# Patient Record
Sex: MALE | Race: WHITE | NOT HISPANIC OR LATINO | URBAN - METROPOLITAN AREA
[De-identification: names, ages, dates, MRNs, and addresses within clinical notes are randomized per-mention and may not be internally consistent; named-entity substitution may affect disease eponyms.]

---

## 2017-11-09 ENCOUNTER — OUTPATIENT (OUTPATIENT)
Dept: OUTPATIENT SERVICES | Facility: HOSPITAL | Age: 37
LOS: 1 days | End: 2017-11-09
Payer: COMMERCIAL

## 2017-11-09 VITALS
DIASTOLIC BLOOD PRESSURE: 77 MMHG | WEIGHT: 195.11 LBS | SYSTOLIC BLOOD PRESSURE: 132 MMHG | HEIGHT: 74 IN | TEMPERATURE: 99 F | RESPIRATION RATE: 14 BRPM | HEART RATE: 74 BPM

## 2017-11-09 DIAGNOSIS — D21.0 BENIGN NEOPLASM OF CONNECTIVE AND OTHER SOFT TISSUE OF HEAD, FACE AND NECK: ICD-10-CM

## 2017-11-09 DIAGNOSIS — Z98.890 OTHER SPECIFIED POSTPROCEDURAL STATES: Chronic | ICD-10-CM

## 2017-11-09 DIAGNOSIS — Z41.9 ENCOUNTER FOR PROCEDURE FOR PURPOSES OTHER THAN REMEDYING HEALTH STATE, UNSPECIFIED: Chronic | ICD-10-CM

## 2017-11-09 DIAGNOSIS — Z01.818 ENCOUNTER FOR OTHER PREPROCEDURAL EXAMINATION: ICD-10-CM

## 2017-11-09 LAB
ANION GAP SERPL CALC-SCNC: 5 MMOL/L — SIGNIFICANT CHANGE UP (ref 5–17)
BUN SERPL-MCNC: 15 MG/DL — SIGNIFICANT CHANGE UP (ref 7–23)
CALCIUM SERPL-MCNC: 9.3 MG/DL — SIGNIFICANT CHANGE UP (ref 8.5–10.1)
CHLORIDE SERPL-SCNC: 103 MMOL/L — SIGNIFICANT CHANGE UP (ref 96–108)
CO2 SERPL-SCNC: 33 MMOL/L — HIGH (ref 22–31)
CREAT SERPL-MCNC: 1 MG/DL — SIGNIFICANT CHANGE UP (ref 0.5–1.3)
GLUCOSE SERPL-MCNC: 82 MG/DL — SIGNIFICANT CHANGE UP (ref 70–99)
HCT VFR BLD CALC: 47.9 % — SIGNIFICANT CHANGE UP (ref 39–50)
HGB BLD-MCNC: 15.5 G/DL — SIGNIFICANT CHANGE UP (ref 13–17)
MCHC RBC-ENTMCNC: 30.4 PG — SIGNIFICANT CHANGE UP (ref 27–34)
MCHC RBC-ENTMCNC: 32.5 GM/DL — SIGNIFICANT CHANGE UP (ref 32–36)
MCV RBC AUTO: 93.6 FL — SIGNIFICANT CHANGE UP (ref 80–100)
PLATELET # BLD AUTO: 166 K/UL — SIGNIFICANT CHANGE UP (ref 150–400)
POTASSIUM SERPL-MCNC: 4.1 MMOL/L — SIGNIFICANT CHANGE UP (ref 3.5–5.3)
POTASSIUM SERPL-SCNC: 4.1 MMOL/L — SIGNIFICANT CHANGE UP (ref 3.5–5.3)
RBC # BLD: 5.12 M/UL — SIGNIFICANT CHANGE UP (ref 4.2–5.8)
RBC # FLD: 11.3 % — SIGNIFICANT CHANGE UP (ref 10.3–14.5)
SODIUM SERPL-SCNC: 141 MMOL/L — SIGNIFICANT CHANGE UP (ref 135–145)
WBC # BLD: 5.6 K/UL — SIGNIFICANT CHANGE UP (ref 3.8–10.5)
WBC # FLD AUTO: 5.6 K/UL — SIGNIFICANT CHANGE UP (ref 3.8–10.5)

## 2017-11-09 PROCEDURE — 85027 COMPLETE CBC AUTOMATED: CPT

## 2017-11-09 PROCEDURE — G0463: CPT

## 2017-11-09 PROCEDURE — 80048 BASIC METABOLIC PNL TOTAL CA: CPT

## 2017-11-09 NOTE — H&P PST ADULT - PMH
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

## 2017-11-09 NOTE — H&P PST ADULT - ANESTHESIA, PREVIOUS REACTION, PROFILE
Pt notes urinary retention following nasal septoplasty which went on for a few days - saw urologist - noted urinary hesitancy - difficulty emptying bladder which resolved without medication

## 2017-11-09 NOTE — H&P PST ADULT - HISTORY OF PRESENT ILLNESS
36 year old male h/o HTN, Anxiety presents for PST prior to Excision of Scalp mass with Dr Bill Phillips on 11/17/17 - 36 year old male h/o HTN, Anxiety presents for PST prior to Excision of Scalp mass with Dr Bill Phillips on 11/17/17 - pt notes he initially noticed it and mentioned it to his doctor about 4 yrs ago and he mentioned it to his PCP who had said they would keep an eye on it - pt notes over the last yr it became bigger and more "Annoying when combing hair/touching area." - Pt notes PCP sent him for consult and following consult pt is electing for scheduled procedure.

## 2017-11-09 NOTE — H&P PST ADULT - ASSESSMENT
36 year old male with HTN and Anxiety - scheduled for Excision of Scalp Mass with Dr Bill Phillips on 11/17/17

## 2017-11-09 NOTE — H&P PST ADULT - NSANTHOSAYNRD_GEN_A_CORE
No. LUPE screening performed.  STOP BANG Legend: 0-2 = LOW Risk; 3-4 = INTERMEDIATE Risk; 5-8 = HIGH Risk/Notes doctor wants him to have a sleep study

## 2017-11-09 NOTE — H&P PST ADULT - PROBLEM SELECTOR PLAN 1
PST Labs; CBC, BMP - No Medical Clearance Needed - pre-op instructions given to pt with understanding verbalized

## 2017-11-09 NOTE — H&P PST ADULT - FAMILY HISTORY
Mother  Still living? Yes, Estimated age: Age Unknown  Hypertension, Age at diagnosis: Age Unknown  Family history of irritable bowel syndrome, Age at diagnosis: Age Unknown

## 2017-11-16 RX ORDER — SODIUM CHLORIDE 9 MG/ML
1000 INJECTION, SOLUTION INTRAVENOUS
Qty: 0 | Refills: 0 | Status: DISCONTINUED | OUTPATIENT
Start: 2017-11-17 | End: 2017-11-17

## 2017-11-17 ENCOUNTER — OUTPATIENT (OUTPATIENT)
Dept: OUTPATIENT SERVICES | Facility: HOSPITAL | Age: 37
LOS: 1 days | End: 2017-11-17
Payer: COMMERCIAL

## 2017-11-17 VITALS
DIASTOLIC BLOOD PRESSURE: 79 MMHG | HEART RATE: 77 BPM | TEMPERATURE: 98 F | OXYGEN SATURATION: 100 % | SYSTOLIC BLOOD PRESSURE: 124 MMHG | RESPIRATION RATE: 14 BRPM

## 2017-11-17 VITALS
SYSTOLIC BLOOD PRESSURE: 124 MMHG | RESPIRATION RATE: 16 BRPM | OXYGEN SATURATION: 99 % | HEIGHT: 74 IN | HEART RATE: 65 BPM | TEMPERATURE: 99 F | DIASTOLIC BLOOD PRESSURE: 87 MMHG | WEIGHT: 195.11 LBS

## 2017-11-17 DIAGNOSIS — Z01.818 ENCOUNTER FOR OTHER PREPROCEDURAL EXAMINATION: ICD-10-CM

## 2017-11-17 DIAGNOSIS — Z41.9 ENCOUNTER FOR PROCEDURE FOR PURPOSES OTHER THAN REMEDYING HEALTH STATE, UNSPECIFIED: Chronic | ICD-10-CM

## 2017-11-17 DIAGNOSIS — D21.0 BENIGN NEOPLASM OF CONNECTIVE AND OTHER SOFT TISSUE OF HEAD, FACE AND NECK: ICD-10-CM

## 2017-11-17 DIAGNOSIS — Z98.890 OTHER SPECIFIED POSTPROCEDURAL STATES: Chronic | ICD-10-CM

## 2017-11-17 PROCEDURE — 88305 TISSUE EXAM BY PATHOLOGIST: CPT | Mod: 26

## 2017-11-17 PROCEDURE — 21014 EXC FACE TUM DEEP 2 CM/>: CPT

## 2017-11-17 PROCEDURE — 88305 TISSUE EXAM BY PATHOLOGIST: CPT

## 2017-11-17 RX ORDER — ESCITALOPRAM OXALATE 10 MG/1
1 TABLET, FILM COATED ORAL
Qty: 0 | Refills: 0 | COMMUNITY

## 2017-11-17 RX ORDER — ACETAMINOPHEN 500 MG
2 TABLET ORAL
Qty: 0 | Refills: 0 | COMMUNITY

## 2017-11-17 RX ORDER — SODIUM CHLORIDE 9 MG/ML
1000 INJECTION, SOLUTION INTRAVENOUS
Qty: 0 | Refills: 0 | Status: DISCONTINUED | OUTPATIENT
Start: 2017-11-17 | End: 2017-11-17

## 2017-11-17 RX ORDER — RAMIPRIL 5 MG
1 CAPSULE ORAL
Qty: 0 | Refills: 0 | COMMUNITY

## 2017-11-17 RX ORDER — ONDANSETRON 8 MG/1
4 TABLET, FILM COATED ORAL ONCE
Qty: 0 | Refills: 0 | Status: DISCONTINUED | OUTPATIENT
Start: 2017-11-17 | End: 2017-11-17

## 2017-11-17 RX ORDER — OXYCODONE AND ACETAMINOPHEN 5; 325 MG/1; MG/1
1 TABLET ORAL EVERY 4 HOURS
Qty: 0 | Refills: 0 | Status: DISCONTINUED | OUTPATIENT
Start: 2017-11-17 | End: 2017-11-17

## 2017-11-17 RX ORDER — HYDROMORPHONE HYDROCHLORIDE 2 MG/ML
0.5 INJECTION INTRAMUSCULAR; INTRAVENOUS; SUBCUTANEOUS
Qty: 0 | Refills: 0 | Status: DISCONTINUED | OUTPATIENT
Start: 2017-11-17 | End: 2017-11-17

## 2017-11-17 RX ORDER — OXYCODONE AND ACETAMINOPHEN 5; 325 MG/1; MG/1
2 TABLET ORAL EVERY 6 HOURS
Qty: 0 | Refills: 0 | Status: DISCONTINUED | OUTPATIENT
Start: 2017-11-17 | End: 2017-11-17

## 2017-11-17 RX ORDER — MAGNESIUM HYDROXIDE 400 MG/1
0 TABLET, CHEWABLE ORAL
Qty: 0 | Refills: 0 | COMMUNITY

## 2017-11-17 RX ORDER — CEFAZOLIN SODIUM 1 G
1000 VIAL (EA) INJECTION ONCE
Qty: 0 | Refills: 0 | Status: COMPLETED | OUTPATIENT
Start: 2017-11-17 | End: 2017-11-17

## 2017-11-17 RX ADMIN — SODIUM CHLORIDE 75 MILLILITER(S): 9 INJECTION, SOLUTION INTRAVENOUS at 12:54

## 2017-11-17 NOTE — ASU DISCHARGE PLAN (ADULT/PEDIATRIC). - NOTIFY
Increased Irritability or Sluggishness/Persistent Nausea and Vomiting/Inability to Tolerate Liquids or Foods/Fever greater than 101/Bleeding that does not stop/Unable to Urinate/Pain not relieved by Medications

## 2017-11-17 NOTE — ASU DISCHARGE PLAN (ADULT/PEDIATRIC). - MEDICATION SUMMARY - MEDICATIONS TO TAKE
I will START or STAY ON the medications listed below when I get home from the hospital:    Norco 5 mg-325 mg oral tablet  -- 1-2 tab(s) by mouth every 4-6 hours as needed for pain. MDD:6  -- Caution federal law prohibits the transfer of this drug to any person other  than the person for whom it was prescribed.  May cause drowsiness.  Alcohol may intensify this effect.  Use care when operating dangerous machinery.  This product contains acetaminophen.  Do not use  with any other product containing acetaminophen to prevent possible liver damage.  Using more of this medication than prescribed may cause serious breathing problems.    -- Indication: For Pain med    Tylenol 325 mg oral tablet  -- 2 tab(s) by mouth every 4 hours as needed for mild pan. **Note Tylenol in Norco- Max daily dose of Tylenol should not exceed 3200mg/24 hrs.  -- Indication: For Mild pain    ramipril 10 mg oral capsule  -- 1 cap(s) by mouth once a day (at bedtime)  -- Indication: For Home med    Milk of Magnesia  -- 30 cc by mouth daily as needed for constipation while on Norco for pain.  -- Indication: For Constipation    Lexapro 10 mg oral tablet  -- 1 tab(s) by mouth once a day (at bedtime)  -- Indication: For Home med

## 2017-11-17 NOTE — ASU DISCHARGE PLAN (ADULT/PEDIATRIC). - SPECIAL INSTRUCTIONS
Keep incision clean, dry and intact x 24 hrs. After 24 hrs, you may begin showering as usual. Do not scrub or soak incision site. Pat dry. NO tub baths, NO swimming pools. No hot tubs. Apply waterproof ice pack to incision area 20 mins on, 20 mins off to help decrease pain and swelling.

## 2017-11-17 NOTE — ASU PATIENT PROFILE, ADULT - PMH
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

## 2017-11-21 LAB — SURGICAL PATHOLOGY FINAL REPORT - CH: SIGNIFICANT CHANGE UP

## 2024-07-15 NOTE — ASU PREOP CHECKLIST - ALLERGIES REVIEWED
Diabetes Self-Management Education & Support    Presents for:      Type of service:  Video Visit      ASSESSMENT:  Follow up visit for diabetes education.  He was prescribed Ozempic but never started it (due to fear of needles as well as concern about thyroid cancer). He has also stopped taking metformin now - had side effects. Shares that he wants to continue focusing on lifestyle modification and would rather not take any medication. Reports that he has been testing BS 1x/d and most of FBS have been at goal/under 130. Feels he knows a fair amount about diabetes management already.    Reports good support from spouse, family     Consumes 3 meals and 1-2 snacks   Food recall:  BF:corn beef hash, 2 eggs, 2 toasts (multi grain)  L:pizza, crazy bread (with marinara sauce)  D:pizza, blizzard  Snacks: corn chips  4 -5 cups of water/other non - sugary beverages. Has cut back on soda and limiting it to once every other day    No planned activity. [employer] - work is very active      Patient's most recent   Lab Results   Component Value Date    A1C 6.5 05/13/2024     is meeting goal of <7.0    PLAN    Continue to test BG 1x/d: fasting/2 hours after a meal.  Your blood sugars goals are:  Before eating (fasting): 80 - 130 mg/dL  2-hours after meals (post-prandial): less than 180 mg/dL     Continue to watch your carb intake and limit to 45-60 grams of carbs per meal. 15-30 grams for snacks.    Regular physical activity as able/safe    Topics to cover at upcoming visits: Problem Solving and Reducing Risks    Follow-up: 9/17    See Care Plan for co-developed, patient-state behavior change goals.  AVS provided for patient today.    Education Materials Provided:  N/a    SUBJECTIVE/OBJECTIVE:  Diabetes education in the past 24mo: Yes  Diabetes type: Type 2  Disease course: Stable  How confident are you filling out medical forms by yourself:: Extremely  Cultural Influences/Ethnic Background:  Not  or     Diabetes  "Symptoms & Complications:  Diabetes Related Symptoms: Neuropathy, Polyuria (increased urination)  Weight trend: Decreasing  Symptom course: Stable  Disease course: Stable       Patient Problem List and Family Medical History reviewed for relevant medical history, current medical status, and diabetes risk factors.    Vitals:  There were no vitals taken for this visit.  Estimated body mass index is 33.69 kg/m  as calculated from the following:    Height as of 5/13/24: 1.657 m (5' 5.25\").    Weight as of 5/13/24: 92.5 kg (204 lb).   Last 3 BP:   BP Readings from Last 3 Encounters:   05/13/24 122/86   04/22/24 122/70   07/19/23 121/81       History   Smoking Status    Every Day    Types: Cigarettes   Smokeless Tobacco    Never       Labs:  Lab Results   Component Value Date    A1C 6.5 05/13/2024     Lab Results   Component Value Date     05/13/2024     04/07/2022     04/03/2021     Lab Results   Component Value Date    LDL 63 05/13/2024    LDL 67 03/01/2021     HDL Cholesterol   Date Value Ref Range Status   03/01/2021 45 >39 mg/dL Final     Direct Measure HDL   Date Value Ref Range Status   05/13/2024 42 >=40 mg/dL Final   ]  GFR Estimate   Date Value Ref Range Status   05/13/2024 68 >60 mL/min/1.73m2 Final   04/03/2021 >60 >60 ml/min/1.73m2 Final     GFR Estimate If Black   Date Value Ref Range Status   04/03/2021 >60 >60 ml/min/1.73m2 Final     Lab Results   Component Value Date    CR 1.17 05/13/2024    CR 0.95 04/03/2021     No results found for: \"MICROALBUMIN\"    Healthy Eating:  Cultural/Adventist diet restrictions?: Yes  How many times a week on average do you eat food made away from home (restaurant/take-out)?: 2  Meals include: Breakfast, Lunch, Dinner  Beverages: Water, Tea, Coffee, Other (see Comments)  Please elaborate:: Sparkling water 0 calorie    Being Active:  Barrier to exercise: Time    Monitoring:  Blood Glucose Meter: Accu-chek  Times checking blood sugar at home (number): " 1  Times checking blood sugar at home (per): Day    Taking Medications:  Diabetes Medication(s)       Biguanides       metFORMIN (GLUCOPHAGE XR) 500 MG 24 hr tablet Take 1 tablet (500 mg) by mouth daily (with dinner)       Incretin Mimetic Agents       semaglutide (OZEMPIC) 2 MG/3ML pen Inject 0.25 mg Subcutaneous every 7 days            Current Treatments: Oral Medication (taken by mouth)    Reducing Risks:  Has dilated eye exam at least once a year?: Yes  Sees dentist every 6 months?: Yes  Feet checked by healthcare provider in the last year?: No    Healthy Coping:  Informal Support system:: Friends, Spouse, Other  Patient Activation Measure Survey Score:      6/20/2011     8:00 AM   MAYANK Score (Last Two)   MAYANK Raw Score 39   Activation Score 56.4   MAYANK Level 3       Time Spent: 45 minutes  Encounter Type: Individual    Any diabetes medication dose changes were made via the CDE Protocol per the patient's referring provider. A copy of this encounter was shared with the provider.    done